# Patient Record
Sex: MALE | Race: WHITE | Employment: OTHER | URBAN - NONMETROPOLITAN AREA
[De-identification: names, ages, dates, MRNs, and addresses within clinical notes are randomized per-mention and may not be internally consistent; named-entity substitution may affect disease eponyms.]

---

## 2020-02-12 ENCOUNTER — HOSPITAL ENCOUNTER (EMERGENCY)
Age: 65
Discharge: HOME | End: 2020-02-12
Payer: MEDICARE

## 2020-02-12 ENCOUNTER — APPOINTMENT (OUTPATIENT)
Dept: GENERAL RADIOLOGY | Age: 65
End: 2020-02-12
Payer: MEDICARE

## 2020-02-12 VITALS
HEIGHT: 62 IN | TEMPERATURE: 98.4 F | HEART RATE: 85 BPM | WEIGHT: 315 LBS | BODY MASS INDEX: 57.97 KG/M2 | DIASTOLIC BLOOD PRESSURE: 60 MMHG | OXYGEN SATURATION: 98 % | RESPIRATION RATE: 20 BRPM | SYSTOLIC BLOOD PRESSURE: 126 MMHG

## 2020-02-12 LAB
ALBUMIN SERPL-MCNC: 3.8 G/DL (ref 3.5–5.1)
ALP BLD-CCNC: 69 U/L (ref 38–126)
ALT SERPL-CCNC: 11 U/L (ref 11–66)
ANION GAP SERPL CALCULATED.3IONS-SCNC: 13 MEQ/L (ref 8–16)
AST SERPL-CCNC: 9 U/L (ref 5–40)
BASOPHILS # BLD: 0.5 %
BASOPHILS ABSOLUTE: 0 THOU/MM3 (ref 0–0.1)
BILIRUB SERPL-MCNC: 0.2 MG/DL (ref 0.3–1.2)
BUN BLDV-MCNC: 27 MG/DL (ref 7–22)
CALCIUM SERPL-MCNC: 9 MG/DL (ref 8.5–10.5)
CHLORIDE BLD-SCNC: 105 MEQ/L (ref 98–111)
CO2: 22 MEQ/L (ref 23–33)
CREAT SERPL-MCNC: 0.8 MG/DL (ref 0.4–1.2)
D-DIMER QUANTITATIVE: 285 NG/ML FEU (ref 0–500)
EKG ATRIAL RATE: 68 BPM
EKG P AXIS: 82 DEGREES
EKG P-R INTERVAL: 134 MS
EKG Q-T INTERVAL: 388 MS
EKG QRS DURATION: 74 MS
EKG QTC CALCULATION (BAZETT): 412 MS
EKG R AXIS: 30 DEGREES
EKG T AXIS: 64 DEGREES
EKG VENTRICULAR RATE: 68 BPM
EOSINOPHIL # BLD: 2 %
EOSINOPHILS ABSOLUTE: 0.2 THOU/MM3 (ref 0–0.4)
ERYTHROCYTE [DISTWIDTH] IN BLOOD BY AUTOMATED COUNT: 12.9 % (ref 11.5–14.5)
ERYTHROCYTE [DISTWIDTH] IN BLOOD BY AUTOMATED COUNT: 41.9 FL (ref 35–45)
FLU A ANTIGEN: NEGATIVE
FLU B ANTIGEN: NEGATIVE
GFR SERPL CREATININE-BSD FRML MDRD: > 90 ML/MIN/1.73M2
GLUCOSE BLD-MCNC: 119 MG/DL (ref 70–108)
HCT VFR BLD CALC: 43.1 % (ref 42–52)
HEMOGLOBIN: 14.2 GM/DL (ref 14–18)
IMMATURE GRANS (ABS): 0.05 THOU/MM3 (ref 0–0.07)
IMMATURE GRANULOCYTES: 0.5 %
LYMPHOCYTES # BLD: 31 %
LYMPHOCYTES ABSOLUTE: 3 THOU/MM3 (ref 1–4.8)
MCH RBC QN AUTO: 29.2 PG (ref 26–33)
MCHC RBC AUTO-ENTMCNC: 32.9 GM/DL (ref 32.2–35.5)
MCV RBC AUTO: 88.5 FL (ref 80–94)
MONOCYTES # BLD: 4.6 %
MONOCYTES ABSOLUTE: 0.4 THOU/MM3 (ref 0.4–1.3)
NUCLEATED RED BLOOD CELLS: 0 /100 WBC
OSMOLALITY CALCULATION: 285.7 MOSMOL/KG (ref 275–300)
PLATELET # BLD: 172 THOU/MM3 (ref 130–400)
PMV BLD AUTO: 10.5 FL (ref 9.4–12.4)
POTASSIUM SERPL-SCNC: 4.4 MEQ/L (ref 3.5–5.2)
PRO-BNP: 427.8 PG/ML (ref 0–900)
RBC # BLD: 4.87 MILL/MM3 (ref 4.7–6.1)
SEG NEUTROPHILS: 61.4 %
SEGMENTED NEUTROPHILS ABSOLUTE COUNT: 5.9 THOU/MM3 (ref 1.8–7.7)
SODIUM BLD-SCNC: 140 MEQ/L (ref 135–145)
TOTAL PROTEIN: 6.9 G/DL (ref 6.1–8)
TROPONIN T: < 0.01 NG/ML
WBC # BLD: 9.6 THOU/MM3 (ref 4.8–10.8)

## 2020-02-12 PROCEDURE — 85025 COMPLETE CBC W/AUTO DIFF WBC: CPT

## 2020-02-12 PROCEDURE — 80053 COMPREHEN METABOLIC PANEL: CPT

## 2020-02-12 PROCEDURE — 99285 EMERGENCY DEPT VISIT HI MDM: CPT

## 2020-02-12 PROCEDURE — 84484 ASSAY OF TROPONIN QUANT: CPT

## 2020-02-12 PROCEDURE — 71046 X-RAY EXAM CHEST 2 VIEWS: CPT

## 2020-02-12 PROCEDURE — 85379 FIBRIN DEGRADATION QUANT: CPT

## 2020-02-12 PROCEDURE — 87804 INFLUENZA ASSAY W/OPTIC: CPT

## 2020-02-12 PROCEDURE — 94640 AIRWAY INHALATION TREATMENT: CPT

## 2020-02-12 PROCEDURE — 93005 ELECTROCARDIOGRAM TRACING: CPT | Performed by: FAMILY MEDICINE

## 2020-02-12 PROCEDURE — 6370000000 HC RX 637 (ALT 250 FOR IP): Performed by: PHYSICIAN ASSISTANT

## 2020-02-12 PROCEDURE — 6360000002 HC RX W HCPCS: Performed by: PHYSICIAN ASSISTANT

## 2020-02-12 PROCEDURE — 83880 ASSAY OF NATRIURETIC PEPTIDE: CPT

## 2020-02-12 PROCEDURE — 36415 COLL VENOUS BLD VENIPUNCTURE: CPT

## 2020-02-12 RX ORDER — AMOXICILLIN AND CLAVULANATE POTASSIUM 875; 125 MG/1; MG/1
1 TABLET, FILM COATED ORAL 2 TIMES DAILY
Qty: 20 TABLET | Refills: 0 | Status: SHIPPED | OUTPATIENT
Start: 2020-02-12 | End: 2020-02-22

## 2020-02-12 RX ORDER — PREDNISONE 20 MG/1
60 TABLET ORAL ONCE
Status: COMPLETED | OUTPATIENT
Start: 2020-02-12 | End: 2020-02-12

## 2020-02-12 RX ORDER — ALBUTEROL SULFATE 2.5 MG/3ML
2.5 SOLUTION RESPIRATORY (INHALATION) EVERY 6 HOURS PRN
Qty: 50 EACH | Refills: 0 | Status: SHIPPED | OUTPATIENT
Start: 2020-02-12

## 2020-02-12 RX ORDER — IPRATROPIUM BROMIDE AND ALBUTEROL SULFATE 2.5; .5 MG/3ML; MG/3ML
1 SOLUTION RESPIRATORY (INHALATION) ONCE
Status: COMPLETED | OUTPATIENT
Start: 2020-02-12 | End: 2020-02-12

## 2020-02-12 RX ORDER — GUAIFENESIN 100 MG/5ML
200 SOLUTION ORAL EVERY 4 HOURS PRN
Qty: 120 ML | Refills: 0 | Status: SHIPPED | OUTPATIENT
Start: 2020-02-12

## 2020-02-12 RX ORDER — PREDNISONE 20 MG/1
40 TABLET ORAL DAILY
Qty: 10 TABLET | Refills: 0 | Status: SHIPPED | OUTPATIENT
Start: 2020-02-12 | End: 2020-02-17

## 2020-02-12 RX ORDER — GUAIFENESIN 100 MG/5ML
200 SOLUTION ORAL ONCE
Status: COMPLETED | OUTPATIENT
Start: 2020-02-12 | End: 2020-02-12

## 2020-02-12 RX ADMIN — IPRATROPIUM BROMIDE AND ALBUTEROL SULFATE 1 AMPULE: .5; 3 SOLUTION RESPIRATORY (INHALATION) at 13:38

## 2020-02-12 RX ADMIN — PREDNISONE 60 MG: 20 TABLET ORAL at 15:10

## 2020-02-12 RX ADMIN — ALBUTEROL SULFATE 2.5 MG: 2.5 SOLUTION RESPIRATORY (INHALATION) at 15:35

## 2020-02-12 RX ADMIN — GUAIFENESIN 200 MG: 200 SOLUTION ORAL at 15:11

## 2020-02-12 ASSESSMENT — PAIN DESCRIPTION - LOCATION
LOCATION: CHEST;HEAD
LOCATION: CHEST;HEAD
LOCATION: HEAD;CHEST

## 2020-02-12 ASSESSMENT — PAIN DESCRIPTION - FREQUENCY: FREQUENCY: INTERMITTENT

## 2020-02-12 ASSESSMENT — PAIN SCALES - GENERAL
PAINLEVEL_OUTOF10: 8
PAINLEVEL_OUTOF10: 8
PAINLEVEL_OUTOF10: 5

## 2020-02-12 ASSESSMENT — PAIN DESCRIPTION - PAIN TYPE
TYPE: ACUTE PAIN

## 2020-02-12 NOTE — ED NOTES
Pt resting in bed with resp even and unlabored. VSS. Pt informed on updated plan of care. Pt denies any needs currently. Call light remains in reach. Will continue to monitor.       Gladys Thomas RN  02/12/20 3530

## 2020-02-12 NOTE — ED NOTES
Bed: 016A  Expected date: 2/12/20  Expected time: 12:49 PM  Means of arrival: [facility]  Comments:     Thi Corey RN  02/12/20 1300

## 2020-02-12 NOTE — ED PROVIDER NOTES
Soph[facility] Holden 13 COMPLAINT       Chief Complaint   Patient presents with    Cough    Shortness of Breath    Headache       Nurses Notes reviewed and I agree except as noted in the HPI. HISTORY OF PRESENT ILLNESS    Kaelyn Edgar is a 60 y.o. male who presents to the Emergency Department for the evaluation of shortness of breath onset 0800 this morning upon waking up. Patient was brought here via EMS. The patient states he has been fighting a cold and sinus infection for the past 7-10 days. He states he woke up with shortness of breath that has worsened throughout the day. He reports associated non-productive cough, congestion, rhinorrhea, chest tightness, wheezing and chest pain when he coughs. He has been having pounding headaches for the past month that makes him almost black out. He states these episodes come on suddenly and they last 5-10 minutes. Patient denies blurry vision, double vision, numbness, weakness, or photophobia when he gets the headaches. Patient states he has been taking tylenol at home with no relief of pain. He rates his current pain as an 8/10 in severity. Patient states he had diarrhea yesterday that has subsided. Patient admits to smoking 1.5 ppd. He denies ever having a DVT/PE. He denies recent travel and states he has been sedentary since being sick. Patient had Asthma when he was younger but states he hasn't had any problems as an adult. No further complaints at the time of the initial encounter. The HPI was provided by the patient. REVIEW OF SYSTEMS     Review of Systems   Constitutional: Negative for chills and fever. HENT: Positive for sinus pressure. Negative for sore throat. Respiratory: Positive for cough. Cardiovascular: Positive for chest pain. Gastrointestinal: Positive for diarrhea. Negative for abdominal pain and vomiting. Musculoskeletal: Negative for back pain.    Skin: Negative for color Breath sounds: Decreased breath sounds and wheezing present. No rhonchi or rales. Comments: Slightly diminished. Chest:      Chest wall: No tenderness. Abdominal:      General: There is no distension. Tenderness: There is generalized abdominal tenderness. Musculoskeletal:      Right lower leg: He exhibits tenderness. Left lower leg: He exhibits tenderness. Comments: Pain bilateral calves. Skin:     General: Skin is warm and dry. Neurological:      Mental Status: He is alert. DIFFERENTIAL DIAGNOSIS:   Discussed at bedside     DIAGNOSTIC RESULTS     EKG: All E[facility]'s are [facility] who either signs or Co-signs this chart in the absence of a cardiologist.    Mer Pendleton. Rate: 68 bpm  MT interval: 134 ms  QRS duration: 74 ms  QTc: 412 ms  P-R-T axes: 82, 30, 64  Normal sinus rhythm  No STEMI    RADIOLOGY: non-plain film images(s) such as CT, Ultrasound and MRI are read by theradiologist.    XR CHEST STANDARD (2 VW)   Final Result   1. No acute cardiopulmonary disease. **This report has been created using voice recognition software. It may contain minor errors which are inherent in voice recognition technology. **      Final report electronically signed by Dr. Susy Reece on 2/12/2020 1:41 PM          LABS:     Labs Reviewed   COMPREHENSIVE METABOLIC PANEL - Abnormal; Notable for the following components:       Result Value    Glucose 119 (*)     BUN 27 (*)     CO2 22 (*)     Total Bilirubin 0.2 (*)     All other components within normal limits   RAPID INFLUENZA A/B ANTIGENS   CBC WITH AUTO DIFFERENTIAL   TROPONIN   BRAIN NATRIURETIC PEPTIDE   D-DIMER, QUANTITATIVE   ANION GAP   OSMOLALITY   GLOMERULAR FILTRATION RATE, ESTIMATED       EMERGENCY DEPARTMENT COURSE:   Vitals:    Vitals:    02/12/20 1301 02/12/20 1436 02/12/20 1548 02/12/20 1722   BP: (!) 154/57 (!) 153/63 136/64 126/60   Pulse: 69 65 70 85   Resp: 18 18 16 20   Temp: 98.4 °F (36.9 °C) albuterol (PROVENTIL) (2.5 MG/3ML) 0.083% nebulizer solution Take 3 mLs by nebulization every 6 hours as needed for Wheezing, Disp-50 each, R-0Print      Respiratory Therapy Supplies (NEBULIZER COMPRESSOR) KIT ONCE Starting Wed 2/12/2020, For 1 dose, Disp-1 kit, R-0, Print             (Please note that portions of this note were completed with a voice recognition program.  Efforts were made to edit the dictations but occasionally words are mis-transcribed.)    The patient was given an opportunity to see the Emergency Attending. The patient voiced understanding that I was a Mid-Level Provider and was in agreement with being seen independently bymyself. Scribe:  Belle Pritchett 12/23/16 10:31 AM Scribing for and in the presence of Celia Glynn PA-C. Signed by: Lili Naidu, 02/16/20 6:49 PM    Provider:  I personally performed the services described in the documentation, reviewed and edited the documentation which was dictated to the scribe in my presence, and it accurately records my wordsand actions.     Celia Glynn PA-C 2/12/20 6:49 PM        Shalonda Pineda PA-C  02/16/20 [id number]

## 2020-02-12 NOTE — ED NOTES
Pt walked down the hallway and back to the room using walker at this time. Pt states that he became short of breath. Pt's breathing became more labored. Pt's pulse while walking was 87. Pt's SpO2 while walking was 95%.      Isha Guerrero RN  02/12/20 1561

## 2020-02-12 NOTE — ED NOTES
Pt resting comfortably in bed with resp even and unlabored. VSS. Pt informed on updated plan of care and denies any needs currently. Call light remains in reach. Will continue to monitor.       Asif Leblanc RN  02/12/20 5370

## 2020-02-16 ASSESSMENT — ENCOUNTER SYMPTOMS
DIARRHEA: 1
SORE THROAT: 0
BACK PAIN: 0
ABDOMINAL PAIN: 0
SINUS PRESSURE: 1
COLOR CHANGE: 0
COUGH: 1
VOMITING: 0